# Patient Record
Sex: MALE | Race: NATIVE HAWAIIAN OR OTHER PACIFIC ISLANDER
[De-identification: names, ages, dates, MRNs, and addresses within clinical notes are randomized per-mention and may not be internally consistent; named-entity substitution may affect disease eponyms.]

---

## 2017-11-26 ENCOUNTER — HOSPITAL ENCOUNTER (EMERGENCY)
Dept: HOSPITAL 14 - H.ER | Age: 32
Discharge: HOME | End: 2017-11-26
Payer: COMMERCIAL

## 2017-11-26 VITALS — RESPIRATION RATE: 18 BRPM | SYSTOLIC BLOOD PRESSURE: 134 MMHG | DIASTOLIC BLOOD PRESSURE: 69 MMHG | HEART RATE: 91 BPM

## 2017-11-26 VITALS — TEMPERATURE: 98 F | OXYGEN SATURATION: 98 %

## 2017-11-26 DIAGNOSIS — R51: ICD-10-CM

## 2017-11-26 DIAGNOSIS — T88.7XXA: Primary | ICD-10-CM

## 2017-11-26 LAB
ALBUMIN/GLOB SERPL: 1.3 {RATIO} (ref 1–2.1)
ALP SERPL-CCNC: 66 U/L (ref 38–126)
ALT SERPL-CCNC: 54 U/L (ref 21–72)
AST SERPL-CCNC: 40 U/L (ref 17–59)
BASOPHILS # BLD AUTO: 0 K/UL (ref 0–0.2)
BASOPHILS NFR BLD: 0.1 % (ref 0–2)
BILIRUB SERPL-MCNC: 0.3 MG/DL (ref 0.2–1.3)
BUN SERPL-MCNC: 9 MG/DL (ref 9–20)
CALCIUM SERPL-MCNC: 9 MG/DL (ref 8.4–10.2)
CHLORIDE SERPL-SCNC: 105 MMOL/L (ref 98–107)
CO2 SERPL-SCNC: 24 MMOL/L (ref 22–30)
EOSINOPHIL # BLD AUTO: 0.1 K/UL (ref 0–0.7)
EOSINOPHIL NFR BLD: 1.3 % (ref 0–4)
ERYTHROCYTE [DISTWIDTH] IN BLOOD BY AUTOMATED COUNT: 12.9 % (ref 11.5–14.5)
GLOBULIN SER-MCNC: 3 GM/DL (ref 2.2–3.9)
GLUCOSE SERPL-MCNC: 107 MG/DL (ref 75–110)
HCT VFR BLD CALC: 37.2 % (ref 35–51)
LYMPHOCYTES # BLD AUTO: 1.9 K/UL (ref 1–4.3)
LYMPHOCYTES NFR BLD AUTO: 27.2 % (ref 20–40)
MCH RBC QN AUTO: 26 PG (ref 27–31)
MCHC RBC AUTO-ENTMCNC: 33.9 G/DL (ref 33–37)
MCV RBC AUTO: 76.8 FL (ref 80–94)
MONOCYTES # BLD: 0.6 K/UL (ref 0–0.8)
MONOCYTES NFR BLD: 8.1 % (ref 0–10)
NEUTROPHILS # BLD: 4.5 K/UL (ref 1.8–7)
NEUTROPHILS NFR BLD AUTO: 63.3 % (ref 50–75)
NRBC BLD AUTO-RTO: 0.1 % (ref 0–0)
PLATELET # BLD: 336 K/UL (ref 130–400)
PMV BLD AUTO: 7.3 FL (ref 7.2–11.7)
POTASSIUM SERPL-SCNC: 3.8 MMOL/L (ref 3.6–5)
PROT SERPL-MCNC: 7 G/DL (ref 6.3–8.2)
SODIUM SERPL-SCNC: 140 MMOL/L (ref 132–148)
TROPONIN I SERPL-MCNC: 0.06 NG/ML (ref 0–0.12)
WBC # BLD AUTO: 7.1 K/UL (ref 4.8–10.8)

## 2017-11-26 NOTE — ED PDOC
- Laboratory Results


Result Diagrams: 


 11/26/17 18:25





 11/26/17 18:25





- ECG


O2 Sat by Pulse Oximetry: 98 (RA)


Pulse Ox Interpretation: Normal





Medical Decision Making


Medical Decision Making: 





Patient signed out to me at 1900 from Dr. Cavazos pending chest x-ray 








1950


Labs reviewed show no clinically significant abnormalities. Patient is 

currently asymptomatic at this time.


Chest x-ray performed is negative.





Patient was advised to refrain from using cialis until he has been seen by a 

urologist.





Upon provider evaluation patient is medically stable and will be discharged 

home.


______________________________________________________________________


Scribe Attestation


Documented by Ankita Chong acting as a scribe for Dino Quesada MD.





Provider Attestation


All medical record entries made by the Scribe were at my direction and 

personally dictated by me. I have reviewed the chart and agree that the record 

accurately reflects my personal performance of the history, physical exam, 

medical decision making, and the department course for this patient. I have 

also personally directed, reviewed, and agree with the discharge instructions 

and disposition.





Disposition


Counseled Patient/Family Regarding: Studies Performed, Diagnosis





- Clinical Impression


Clinical Impression: 


 Headache, Adverse reaction to drug








- POA


Present On Arrival: None





- Disposition


Disposition: Routine/Home


Disposition Time: 19:48


Condition: STABLE


Additional Instructions: 


Avoid usage of Cialis until you have followed up with your primary care provider


Instructions:  Adverse Drug Reaction (ED)


Forms:  [facility] (English)

## 2017-11-26 NOTE — ED PDOC
Syncope/Near Syncope/Dizziness


Time Seen by Provider: 11/26/17 18:07


Chief Complaint (Nursing): Dizziness/Lightheaded


History Per: Patient


Onset/Duration Of Symptoms: Days


Current Symptoms Are (Timing): Intermittent Episodes


Activity At Onset Of Symptoms: Standing


Associated Symptoms Preceding Syncopal Episode: Lightheadedness


Seizure Or Post-ictal Symptoms: None


Severity: Mild (Palpitations assoc with lightheadedness this AM after taking 

Cialis. Also developed headache unresponsive to Ibuprofen and 2 ASA. Seen 

[facility] with abnormal EKG and referred to ED. No chest pain at present.)





Past Medical History


Vital Signs: 





 Last Vital Signs











Temp  98 F   11/26/17 17:59


 


Pulse  102 H  11/26/17 17:59


 


Resp  20   11/26/17 17:59


 


BP  129/77   11/26/17 17:59


 


Pulse Ox  98   11/26/17 17:59














- Medical History


PMH: No Chronic Diseases





- Family History


Family History: States: Unknown Family Hx





- Allergies


Allergies/Adverse Reactions: 


 Allergies











Allergy/AdvReac Type Severity Reaction Status Date / Time


 


No Known Allergies Allergy   Verified 11/26/17 17:59














Review of Systems


ROS Statement: Except As Marked, All Systems Reviewed And Found Negative


Cardiovascular: Positive for: Palpitations


Neurological: Positive for: Headache, Dizziness





Physical Exam





- Reviewed


Nursing Documentation Reviewed: Yes


Vital Signs Reviewed: Yes





- Physical Exam


Appears: Positive for: Non-toxic, No Acute Distress


Head Exam: Positive for: ATRAUMATIC, NORMAL INSPECTION, NORMOCEPHALIC


Skin: Positive for: Normal Color, Warm, DRY


Eye Exam: Positive for: EOMI, Normal appearance, PERRL


ENT: Positive for: Normal ENT Inspection


Neck: Positive for: Normal, Painless ROM


Cardiovascular/Chest: Positive for: Regular Rate, Rhythm


Respiratory: Positive for: CNT, Normal Breath Sounds


Gastrointestinal/Abdominal: Positive for: Normal Exam, Bowel Sounds, Soft


Back: Positive for: Normal Inspection


Extremity: Positive for: Normal ROM


Neurologic/Psych: Positive for: Alert, Oriented





- Laboratory Results


Result Diagrams: 


 11/26/17 18:25





 11/26/17 18:25





- ECG


O2 Sat by Pulse Oximetry: 98





Medical Decision Making


Medical Decision Making: 


Patient signed out to Dr. Quesada at 1900 pending Chest x-ray.














_________________________________________________________________


Scribe Attestation


Documented by Ankita Chong acting as a scribe for Michael Cavazos MD.





Provider Attestation


All medical record entries made by the Scribe were at my direction and 

personally dictated by me. I have reviewed the chart and agree that the record 

accurately reflects my personal performance of the history, physical exam, 

medical decision making, and the department course for this patient. I have 

also personally directed, reviewed, and agree with the discharge instructions 

and disposition.





Disposition





- Clinical Impression


Clinical Impression: 


 Headache, Adverse reaction to drug








- Patient ED Disposition


Is Patient to be Admitted: Transfer of Care





- Disposition


Disposition: Transfer of Care


Disposition Time: 19:00


Condition: STABLE


Additional Instructions: 


Avoid usage of Cialis until you have followed up with your primary care provider


Instructions:  Adverse Drug Reaction (ED)


Forms:  [facility] (English)


Patient Signed Over To: Dino Quesada

## 2017-11-27 NOTE — CARD
--------------- APPROVED REPORT --------------





EKG Measurement

Heart [id number]

DE [id number]

[id number]

[id number]

[id number]



<Conclusion>

Sinus tachycardia

Otherwise normal ECG

## 2017-11-27 NOTE — RAD
HISTORY:

palpitations  



COMPARISON:

No prior.



TECHNIQUE:

Chest PA and lateral



FINDINGS:



LUNGS:

No active pulmonary disease.



PLEURA:

No significant pleural effusion identified. No pneumothorax apparent.



CARDIOVASCULAR:

Normal.



OSSEOUS STRUCTURES:

No significant abnormalities.



VISUALIZED UPPER ABDOMEN:

Normal.



OTHER FINDINGS:

None.



IMPRESSION:

No active disease.

## 2018-11-11 ENCOUNTER — HOSPITAL ENCOUNTER (EMERGENCY)
Dept: HOSPITAL 14 - H.ER | Age: 33
Discharge: HOME | End: 2018-11-11
Payer: COMMERCIAL

## 2018-11-11 VITALS — DIASTOLIC BLOOD PRESSURE: 70 MMHG | SYSTOLIC BLOOD PRESSURE: 122 MMHG | HEART RATE: 89 BPM | TEMPERATURE: 99.4 F

## 2018-11-11 VITALS — RESPIRATION RATE: 18 BRPM

## 2018-11-11 DIAGNOSIS — E05.90: ICD-10-CM

## 2018-11-11 DIAGNOSIS — J06.9: Primary | ICD-10-CM

## 2018-11-11 NOTE — ED PDOC
HPI: Fever


Time Seen by Provider: 11/11/18 19:56


Additional Comments: 33 yr old male hx hyperthyroid, presents with sore throat 

and fever onset this morning. Patient states he felt well yesterday, woke up 

this morning feeling sick, began to have sore throat. He reports that earlier 

afternoon he developed a fever to 102 and took Tylenol at 6:30 pm for fever. 

Patient denies cough chills nausea, vomiting, diarrhea or sick contacts. He has 

not received flu shot this season, which caused concern and wanted to get 

checked out. No further medical complaints.





Past Medical History


Reviewed: Historical Data, Nursing Documentation, Vital Signs


Vital Signs: 





                                Last Vital Signs











Temp  101.1 F H  11/11/18 19:36


 


Pulse  92 H  11/11/18 19:36


 


Resp  18   11/11/18 19:36


 


BP  121/89   11/11/18 19:36


 


Pulse Ox  97   11/11/18 19:36














- Family History


Family History: States: Unknown Family Hx





- Home Medications


Home Medications: 


                                Ambulatory Orders











 Medication  Instructions  Recorded


 


Ibuprofen [Motrin Tab] 600 mg PO Q6 PRN 5 Days  tab 11/11/18














- Allergies


Allergies/Adverse Reactions: 


                                    Allergies











Allergy/AdvReac Type Severity Reaction Status Date / Time


 


No Known Allergies Allergy   Verified 11/11/18 19:35














Review of Systems


ROS Statement: Except As Marked, All Systems Reviewed And Found Negative


Constitutional: Negative for: Fever, Chills


ENT: Positive for: Throat Pain


Respiratory: Negative for: Cough


Gastrointestinal: Negative for: Nausea, Vomiting





Physical Exam





- Reviewed


Nursing Documentation Reviewed: Yes


Vital Signs Reviewed: Yes





- Physical Exam


Appears: Positive for: Well, Non-toxic, No Acute Distress


Head Exam: Positive for: ATRAUMATIC, NORMAL INSPECTION, NORMOCEPHALIC


Eye Exam: Positive for: Normal appearance, EOMI, PERRL.  Negative for: 

Conjunctival injection


ENT: Positive for: Normal ENT Inspection, Other (lymph nodes normal)


Neck: Positive for: Normal, Painless ROM


Cardiovascular/Chest: Positive for: Regular Rate, Rhythm


Respiratory: Positive for: CNT, Normal Breath Sounds


Neurologic/Psych: Positive for: Alert, Oriented





- ECG


O2 Sat by Pulse Oximetry: 97 (RA)


Pulse Ox Interpretation: Normal





Medical Decision Making


Medical Decision Making: 





Time: 19:56


Initial Impression:


Initial Plan:


--Ibuprofen 600 mg PO


--Urine dip


--Rapid Influenza


--Rapid strep








---------------------------------------------------------------

----------------------------------


Scribe Attestation:


Documented by Keshia Ames, acting as a scribe for Ольга Heck PA-C.


   


Provider Scribe Attestation:


All medical record entries made by the Scribe were at my direction and 

personally dictated by me. I have reviewed the chart and agree that the record 

accurately reflects my personal performance of the history, physical exam, 

medical decision making, and the department course for this patient. I have also

personally directed, reviewed, and agree with the discharge instructions and 

disposition.





Disposition





- Clinical Impression


Clinical Impression: 


 Viral upper respiratory illness








- Patient ED Disposition


Is Patient to be Admitted: No


Counseled Patient/Family Regarding: Studies Performed, Diagnosis, Need For 

Followup, Rx Given





- Disposition


Disposition: Routine/Home


Disposition Time: 22:14


Condition: STABLE


Additional Instructions: 


F/u with your primary care doctor as needed for worsening symptoms or return to 

ER if you develop shortness of breath or chest pain. Use hand hygiene and avoid 

close contact with others. You should not return to work until your fever 

subsides. Get lots of rest and hydrate. Tylenol or Ibuprofen as needed. 


Prescriptions: 


Ibuprofen [Motrin Tab] 600 mg PO Q6 PRN 5 Days  tab


 PRN Reason: Fever >100.4 F


Instructions:  Viral Upper Respiratory Infection, Adult (DC)


Forms:  [employer] (English)


Print Language: ENGLISH

## 2018-11-12 VITALS — OXYGEN SATURATION: 97 %
